# Patient Record
Sex: FEMALE | Race: BLACK OR AFRICAN AMERICAN | Employment: PART TIME | URBAN - METROPOLITAN AREA
[De-identification: names, ages, dates, MRNs, and addresses within clinical notes are randomized per-mention and may not be internally consistent; named-entity substitution may affect disease eponyms.]

---

## 2019-04-04 ENCOUNTER — HOSPITAL ENCOUNTER (OUTPATIENT)
Dept: NUCLEAR MEDICINE | Age: 39
Discharge: HOME | End: 2019-04-04
Payer: COMMERCIAL

## 2019-04-04 DIAGNOSIS — E05.90 THYROTOXICOSIS WITHOUT THYROID STORM, UNSPECIFIED THYROTOXICOSIS TYPE: ICD-10-CM

## 2019-04-04 PROCEDURE — 78014 THYROID IMAGING W/BLOOD FLOW: CPT

## 2019-04-04 PROCEDURE — A9509 IODINE I-123 SOD IODIDE MIL: HCPCS | Performed by: INTERNAL MEDICINE

## 2019-04-04 PROCEDURE — 3430000000 HC RX DIAGNOSTIC RADIOPHARMACEUTICAL: Performed by: INTERNAL MEDICINE

## 2019-04-04 RX ADMIN — Medication 0.23 MILLICURIE: at 09:30

## 2019-04-05 ENCOUNTER — HOSPITAL ENCOUNTER (OUTPATIENT)
Dept: NUCLEAR MEDICINE | Age: 39
Discharge: HOME | End: 2019-04-05
Payer: COMMERCIAL

## 2021-07-16 ENCOUNTER — HOSPITAL ENCOUNTER (OUTPATIENT)
Dept: GENERAL RADIOLOGY | Age: 41
Discharge: HOME | End: 2021-07-16
Payer: COMMERCIAL

## 2021-07-16 DIAGNOSIS — Z80.0: ICD-10-CM

## 2021-07-16 PROCEDURE — 74250 X-RAY XM SM INT 1CNTRST STD: CPT

## 2022-03-17 ENCOUNTER — HOSPITAL ENCOUNTER (OUTPATIENT)
Age: 42
Discharge: HOME | End: 2022-03-17

## 2022-03-17 ENCOUNTER — HOSPITAL ENCOUNTER (OUTPATIENT)
Dept: GENERAL RADIOLOGY | Age: 42
Discharge: HOME | End: 2022-03-17

## 2022-03-17 DIAGNOSIS — Z11.1 SCREENING-PULMONARY TB: ICD-10-CM

## 2022-03-17 PROCEDURE — 71046 X-RAY EXAM CHEST 2 VIEWS: CPT

## 2023-02-24 NOTE — PROGRESS NOTES
RHEUMATOLOGY NEW PATIENT VISIT    2023      Patient Name: Reyna Kim  : 1980  Medical Record: [id number]      CHIEF COMPLAINT    Polyarthralgia    Pertinent Problems  HTN  Lower quadrant abdominal pain  History of uterine fibroids    HISTORY OF PRESENT ILLNESS  Graves disease       Reyna Kim is a 38 y.o. female who was referred for above concerns. Symptom onset began about 6 months ago. Of note she has graves disease and and diffuse widespread pain since 3-4 years. Recently pain no involves her hip, hands,ankles and knees. She has used tylenol arthritis that worked art first bit no longer helping. She has never been seen by rheumatology. Follows with endocrinology and takes methimazole. Disease progression:   Today, patient describes joint knuckles, hip, ankle and knees. She notes swelling in her knuckles, legs and ankles   Joint stiffness in hips a.m and with prolonged sitting lasting 1-2 hours   Relieving factors: moving   Worsening factors: prolonged rest   Associated symptoms: wrist rash ++ denies fever, chest pain, rash, chronic cough, sob  Pain level today: 6-10/10   No recent hospitalizations or fever/infections   Functional status: she is an in patient nurse, works nights, sometimes stands for long hours. Other rheumatologic symptoms :  Dry eyes+ Denies chest pain, SOB, fever, rash, oral/nasal ulcers, change in mental status, dry mouth, Muscle pain, muscle weakness, raynaud's, puffy fingers or skin thickening. History of miscarriages, blood clots, dactylitis, uveitis, enthesitis, psoriasis, buttock pain, change in BM    Risk factors: Active smoking since 6 years, obesity+, occasional etoh, no recreational drug use, family hx of psoriasis, 1st cousin with RA and lupus     Current rheum meds: none     Past rheum meds: none     No flowsheet data found.         REVIEW OF SYSTEMS     Constitutional:  Denies fever or chills, decreased appetite, or weight loss   Eyes:  Dry eyes + HENT:  Denies dry mouth or oral ulcers  Respiratory:  Denies cough or shortness of breath   Cardiovascular:  Denies chest pain or edema   GI:  Denies abdominal pain, nausea, vomiting, bloody stools or diarrhea   :  Denies dysuria or hematuria  Musculoskeletal:  See HPI  Integument:  Rash+   Neurologic:  Denies headache, focal weakness or sensory changes   Endocrine:  Denies polyuria or polydipsia   Lymphatic:  Denies swollen glands   Psychiatric:  Denies depression or anxiety       PROBLEM LIST    Patient Active Problem List   Diagnosis    Benign essential hypertension, antepartum    Right lower quadrant abdominal pain    Uterine fibroids in pregnancy, postpartum condition       MEDICATIONS    Current Outpatient Medications   Medication Sig Dispense Refill    propranolol (INDERAL) 60 MG tablet Take 60 mg by mouth daily      Loratadine (CLARITIN PO) Take 1 tablet by mouth daily 10 mg      pantoprazole (PROTONIX) 40 MG tablet Take 40 mg by mouth daily      methIMAzole (TAPAZOLE) 5 MG tablet Take 5 mg by mouth daily      ibuprofen (ADVIL;MOTRIN) 800 MG tablet Take 1 tablet by mouth every 8 hours as needed for Pain (Patient not taking: Reported on 2/27/2023) 30 tablet 0    metroNIDAZOLE (FLAGYL) 500 MG tablet  (Patient not taking: Reported on 2/27/2023)      ciprofloxacin (CIPRO) 500 MG tablet  (Patient not taking: Reported on 2/27/2023)      Prenat w/o P-HY-Aqtfxvp-FA-DHA (PNV-DHA) 27-0.6-0.4-300 MG CAPS  (Patient not taking: Reported on 2/27/2023)      furosemide (LASIX) 20 MG tablet Take 1 tablet by mouth daily (Patient not taking: Reported on 2/27/2023) 60 tablet 3    carvedilol (COREG) 6.25 MG tablet Take 1 tablet by mouth 2 times daily (with meals) (Patient not taking: Reported on 2/27/2023) 60 tablet 3    potassium chloride SA (K-DUR;KLOR-CON M) 20 MEQ tablet Take 2 tablets by mouth daily (Patient not taking: Reported on 2/27/2023) 20 tablet 0    [id number] PO Take  by mouth.  (Patient not taking: Reported on 2/27/2023)       No current facility-administered medications for this visit. ALLERGIES    Allergies   Allergen Reactions    Aspirin Anaphylaxis    Pcn [Penicillins] Anaphylaxis       PAST MEDICAL HISTORY      No past medical history on file. SOCIAL HISTORY     Social History     Socioeconomic History    Marital status: Single   Tobacco Use    Smoking status: Never    Smokeless tobacco: Never   Substance and Sexual Activity    Alcohol use: No    Drug use: No    Sexual activity: Yes     Partners: Male         FAMILY HISTORY     No family history on file. PHYSICAL EXAM     Wt Readings from Last 3 Encounters:   02/27/23 209 lb (94.8 kg)   03/28/16 195 lb (88.5 kg)   09/30/15 211 lb 12.8 oz (96.1 kg)     Temp Readings from Last 3 Encounters:   03/28/16 98.3 °F (36.8 °C) (Oral)     BP Readings from Last 3 Encounters:   02/27/23 95/65   03/28/16 141/60   09/30/15 124/82     Pulse Readings from Last 3 Encounters:   02/27/23 80   03/28/16 97       General appearance:  Alert and oriented, NAD, well developed   HEENT: EOMI, no scleral injection, moist mucous membranes, no oral ulcers, normal hearing, no cartilage inflammation  Neck: Trachea midline, no masses  Lymph: no LAD  Lungs: CTAB, no rales  Heart: regular rate and rhythm, S1, S2 normal, no murmur, no lower extremity edema  Abdomen: Soft, ND, NT. + BS. Extremities: atraumatic, no cyanosis or edema. Neurologic: CN 2-12 grossly intact. Skin: No active rashes, warm and dry, no telangiectasias, no digital pitting, no sclerodactyly, no rheumatoid nodules, no livedo  MSK: normal ROM of the small joints of the hands, wrists, elbows, shoulders, hips, knees, ankles, or MTPs. 5/5 strength of the proximal and distal muscles of the upper and lower extremities.    HANDS: no synovitis, good ROM,   Elbow: No synovitis, good ROM,   Shoulder:good ROM,   Knee: no effusion, good ROM,   Ankle:good ROM,   FEET: neg forefoot squeeze test    Spine:  Normal range of motion; no tender points, no obvious deformities. Neuro:  Alert & oriented x 3, normal motor function, normal sensory function, no focal deficits noted . Muscle strength: 4/4 in bilateral upper and lower extremities. Psychiatric: Mood and affect are appropriate, recent and remote memory normal,          LABS AND IMAGING  Available labs were reviewed and discussed with patient   9/25/2015  WBC within normal limits  Hemoglobin 9.2, low  MCV within normal limits  Platelets 149, high  Lymphocytes within normal limits  Monocytes 10 high  Eosinophils 2.3 within normal limits    Assessment   Patient is a 79-year-old female with history of Graves' disease, dry eyes and periodic lower extremity swelling presenting with new onset pain in hands, hip, knees and ankle associated with swelling. She is currently taking methimazole for Graves' disease. Today examination findings did not show synovitis. But she reports stiffness in left hand. We will rule out inflammatory arthritis, use of methimazole increases risk for lupus-like arthralgia. We will investigate for that as well    1. Polyarthralgia  - Quantiferon, Incubated; Future  - Uric Acid; Future  - Rheumatoid Factor; Future  - XR HAND RIGHT (MIN 3 VIEWS); Future  - XR HAND LEFT (MIN 3 VIEWS); Future  - Hepatitis Panel, Acute; Future  - Cyclic Citrul Peptide Antibody, IgG; Future  - C-Reactive Protein; Future  - Sedimentation Rate; Future  - Renal Function Panel; Future  - CBC; Future  - Vitamin D 25 Hydroxy; Future  - Hepatic Function Panel; Future  - JUSTINO with HEp-2 IgG by IFA; Future  - HISTONE ANTIBODIES IGG; Future    2. Encounter for other specified special examinations  - Hepatitis Panel, Acute; Future    3. Obesity (BMI 30.0-34.9)  - Vitamin D 25 Hydroxy;  Future     Patient Instructions  Complete ordered labs and get imaging done  Okay to continue tylenol for now not exceeding 6 tabs per day   We will discuss results at next visit  RTC in 3 weeks -  The patient indicates understanding of these issues and agrees with the plan.     I spent 35  minutes on the date of service, preparing to see the patient (eg, review of tests), obtaining and/or reviewing separately obtained history, counseling and educating the family/caregiver, ordering medications, tests, or procedures, referring and communicating with other health care professionals, documenting clinical information in the electronic or other health record, care coordination (not separately reported)This note was dictated with voice recognition Haydee Aleman MD

## 2023-02-27 ENCOUNTER — HOSPITAL ENCOUNTER (OUTPATIENT)
Dept: GENERAL RADIOLOGY | Age: 43
Discharge: HOME | End: 2023-02-27
Payer: COMMERCIAL

## 2023-02-27 ENCOUNTER — HOSPITAL ENCOUNTER (OUTPATIENT)
Age: 43
Discharge: HOME | End: 2023-02-27
Payer: COMMERCIAL

## 2023-02-27 ENCOUNTER — OFFICE VISIT (OUTPATIENT)
Dept: RHEUMATOLOGY | Age: 43
End: 2023-02-27
Payer: COMMERCIAL

## 2023-02-27 VITALS
HEART RATE: 80 BPM | OXYGEN SATURATION: 99 % | DIASTOLIC BLOOD PRESSURE: 65 MMHG | BODY MASS INDEX: 35.87 KG/M2 | SYSTOLIC BLOOD PRESSURE: 95 MMHG | WEIGHT: 209 LBS

## 2023-02-27 DIAGNOSIS — M25.50 POLYARTHRALGIA: ICD-10-CM

## 2023-02-27 DIAGNOSIS — E66.9 OBESITY (BMI 30.0-34.9): ICD-10-CM

## 2023-02-27 DIAGNOSIS — M25.50 POLYARTHRALGIA: Primary | ICD-10-CM

## 2023-02-27 DIAGNOSIS — Z01.89 ENCOUNTER FOR OTHER SPECIFIED SPECIAL EXAMINATIONS: ICD-10-CM

## 2023-02-27 LAB
25(OH)D3 SERPL-MCNC: 11.56 NG/ML
ALBUMIN SERPL-MCNC: 4.4 GM/DL (ref 3.4–5)
ALBUMIN SERPL-MCNC: 4.4 GM/DL (ref 3.4–5)
ALP BLD-CCNC: 65 IU/L (ref 40–129)
ALT SERPL-CCNC: 10 U/L (ref 10–40)
ANION GAP SERPL CALCULATED.3IONS-SCNC: 8 MMOL/L (ref 4–16)
AST SERPL-CCNC: 16 IU/L (ref 15–37)
BILIRUB SERPL-MCNC: 0.2 MG/DL (ref 0–1)
BILIRUBIN DIRECT: 0.2 MG/DL (ref 0–0.3)
BILIRUBIN, INDIRECT: 0 MG/DL (ref 0–0.7)
BUN SERPL-MCNC: 13 MG/DL (ref 6–23)
CALCIUM SERPL-MCNC: 9.1 MG/DL (ref 8.3–10.6)
CHLORIDE BLD-SCNC: 102 MMOL/L (ref 99–110)
CO2: 25 MMOL/L (ref 21–32)
CREAT SERPL-MCNC: 0.7 MG/DL (ref 0.6–1.1)
ERYTHROCYTE SEDIMENTATION RATE: 16 MM/HR (ref 0–20)
GFR SERPL CREATININE-BSD FRML MDRD: >60 ML/MIN/1.73M2
GLUCOSE SERPL-MCNC: 77 MG/DL (ref 70–99)
HAV IGM SERPL QL IA: NON REACTIVE
HBV CORE IGM SERPL QL IA: NON REACTIVE
HBV SURFACE AG SERPL QL IA: NON REACTIVE
HCT VFR BLD CALC: 35.9 % (ref 37–47)
HCV AB SERPL QL IA: NON REACTIVE
HEMOGLOBIN: 11 GM/DL (ref 12.5–16)
HIGH SENSITIVE C-REACTIVE PROTEIN: 0.5 MG/L (ref 0–5)
MCH RBC QN AUTO: 26.3 PG (ref 27–31)
MCHC RBC AUTO-ENTMCNC: 30.6 % (ref 32–36)
MCV RBC AUTO: 85.9 FL (ref 78–100)
PDW BLD-RTO: 12.7 % (ref 11.7–14.9)
PHOSPHORUS: 2.9 MG/DL (ref 2.5–4.9)
PLATELET # BLD: 288 K/CU MM (ref 140–440)
PMV BLD AUTO: 10.6 FL (ref 7.5–11.1)
POTASSIUM SERPL-SCNC: 3.8 MMOL/L (ref 3.5–5.1)
RBC # BLD: 4.18 M/CU MM (ref 4.2–5.4)
SODIUM BLD-SCNC: 135 MMOL/L (ref 135–145)
TOTAL PROTEIN: 6.8 GM/DL (ref 6.4–8.2)
URATE SERPL-MCNC: 4.7 MG/DL (ref 2.6–6)
WBC # BLD: 6.7 K/CU MM (ref 4–10.5)

## 2023-02-27 PROCEDURE — 86141 C-REACTIVE PROTEIN HS: CPT

## 2023-02-27 PROCEDURE — 84550 ASSAY OF BLOOD/URIC ACID: CPT

## 2023-02-27 PROCEDURE — 82248 BILIRUBIN DIRECT: CPT

## 2023-02-27 PROCEDURE — 85652 RBC SED RATE AUTOMATED: CPT

## 2023-02-27 PROCEDURE — 85027 COMPLETE CBC AUTOMATED: CPT

## 2023-02-27 PROCEDURE — G8484 FLU IMMUNIZE NO ADMIN: HCPCS | Performed by: STUDENT IN AN ORGANIZED HEALTH CARE EDUCATION/TRAINING PROGRAM

## 2023-02-27 PROCEDURE — 36415 COLL VENOUS BLD VENIPUNCTURE: CPT

## 2023-02-27 PROCEDURE — G8427 DOCREV CUR MEDS BY ELIG CLIN: HCPCS | Performed by: STUDENT IN AN ORGANIZED HEALTH CARE EDUCATION/TRAINING PROGRAM

## 2023-02-27 PROCEDURE — 80053 COMPREHEN METABOLIC PANEL: CPT

## 2023-02-27 PROCEDURE — 99204 OFFICE O/P NEW MOD 45 MIN: CPT | Performed by: STUDENT IN AN ORGANIZED HEALTH CARE EDUCATION/TRAINING PROGRAM

## 2023-02-27 PROCEDURE — 86430 RHEUMATOID FACTOR TEST QUAL: CPT

## 2023-02-27 PROCEDURE — 83516 IMMUNOASSAY NONANTIBODY: CPT

## 2023-02-27 PROCEDURE — 82306 VITAMIN D 25 HYDROXY: CPT

## 2023-02-27 PROCEDURE — G8419 CALC BMI OUT NRM PARAM NOF/U: HCPCS | Performed by: STUDENT IN AN ORGANIZED HEALTH CARE EDUCATION/TRAINING PROGRAM

## 2023-02-27 PROCEDURE — 73130 X-RAY EXAM OF HAND: CPT

## 2023-02-27 PROCEDURE — 80074 ACUTE HEPATITIS PANEL: CPT

## 2023-02-27 PROCEDURE — 86200 CCP ANTIBODY: CPT

## 2023-02-27 PROCEDURE — 86038 ANTINUCLEAR ANTIBODIES: CPT

## 2023-02-27 PROCEDURE — 84100 ASSAY OF PHOSPHORUS: CPT

## 2023-02-27 PROCEDURE — 4004F PT TOBACCO SCREEN RCVD TLK: CPT | Performed by: STUDENT IN AN ORGANIZED HEALTH CARE EDUCATION/TRAINING PROGRAM

## 2023-02-27 PROCEDURE — 86480 TB TEST CELL IMMUN MEASURE: CPT

## 2023-02-27 RX ORDER — PANTOPRAZOLE SODIUM 40 MG/1
40 TABLET, DELAYED RELEASE ORAL DAILY
COMMUNITY

## 2023-02-27 RX ORDER — PROPRANOLOL HYDROCHLORIDE 60 MG/1
60 TABLET ORAL DAILY
COMMUNITY

## 2023-02-27 RX ORDER — METHIMAZOLE 5 MG/1
5 TABLET ORAL DAILY
COMMUNITY

## 2023-02-27 NOTE — PATIENT INSTRUCTIONS
Complete ordered labs and get imaging done  Okay to continue tylenol for now not exceeding 6 tabs per day   We will discuss results at next visit  RTC in 3 weeks

## 2023-02-28 LAB
CCP IGG SERPL-ACNC: 6 UNITS (ref 0–19)
RHEUMATOID FACTOR: <10 IU/ML (ref 0–14)

## 2023-03-01 LAB
NUCLEAR IGG SER QL IA: NORMAL
QUANTI TB1 MINUS NIL: 0 IU/ML (ref 0–0.34)
QUANTI TB2 MINUS NIL: 0 IU/ML (ref 0–0.34)
QUANTIFERON (R) TB GOLD (INCUBATED): NEGATIVE IU/ML
QUANTIFERON MITOGEN MINUS NIL: >10 IU/ML
QUANTIFERON NIL: 0.02 IU/ML

## 2023-03-02 LAB — HISTONE IGG SER IA-ACNC: 1.4 UNITS (ref 0–0.9)

## 2023-03-24 ENCOUNTER — OFFICE VISIT (OUTPATIENT)
Dept: RHEUMATOLOGY | Age: 43
End: 2023-03-24
Payer: COMMERCIAL

## 2023-03-24 VITALS
HEIGHT: 64 IN | WEIGHT: 204 LBS | RESPIRATION RATE: 14 BRPM | HEART RATE: 81 BPM | OXYGEN SATURATION: 99 % | SYSTOLIC BLOOD PRESSURE: 140 MMHG | DIASTOLIC BLOOD PRESSURE: 86 MMHG | BODY MASS INDEX: 34.83 KG/M2

## 2023-03-24 DIAGNOSIS — E55.9 VITAMIN D DEFICIENCY: ICD-10-CM

## 2023-03-24 DIAGNOSIS — M35.00 SICCA, UNSPECIFIED TYPE (HCC): ICD-10-CM

## 2023-03-24 DIAGNOSIS — M25.50 POLYARTHRALGIA: Primary | ICD-10-CM

## 2023-03-24 PROCEDURE — G8427 DOCREV CUR MEDS BY ELIG CLIN: HCPCS | Performed by: STUDENT IN AN ORGANIZED HEALTH CARE EDUCATION/TRAINING PROGRAM

## 2023-03-24 PROCEDURE — 1036F TOBACCO NON-USER: CPT | Performed by: STUDENT IN AN ORGANIZED HEALTH CARE EDUCATION/TRAINING PROGRAM

## 2023-03-24 PROCEDURE — G8417 CALC BMI ABV UP PARAM F/U: HCPCS | Performed by: STUDENT IN AN ORGANIZED HEALTH CARE EDUCATION/TRAINING PROGRAM

## 2023-03-24 PROCEDURE — 99214 OFFICE O/P EST MOD 30 MIN: CPT | Performed by: STUDENT IN AN ORGANIZED HEALTH CARE EDUCATION/TRAINING PROGRAM

## 2023-03-24 PROCEDURE — G8484 FLU IMMUNIZE NO ADMIN: HCPCS | Performed by: STUDENT IN AN ORGANIZED HEALTH CARE EDUCATION/TRAINING PROGRAM

## 2023-03-24 RX ORDER — MELATONIN
1000 DAILY
Qty: 90 TABLET | Refills: 1 | Status: SHIPPED | OUTPATIENT
Start: 2023-03-24

## 2023-03-24 NOTE — PROGRESS NOTES
ulcers  Respiratory:  Denies cough or shortness of breath   Cardiovascular:  Denies chest pain or edema   GI:  Denies abdominal pain, nausea, vomiting, bloody stools or diarrhea   :  Denies dysuria or hematuria  Musculoskeletal:  See HPI  Integument:  Rash+   Neurologic:  Denies headache, focal weakness or sensory changes   Endocrine:  Denies polyuria or polydipsia   Lymphatic:  Denies swollen glands   Psychiatric:  Denies depression or anxiety       PROBLEM LIST    Patient Active Problem List   Diagnosis    Benign essential hypertension, antepartum    Right lower quadrant abdominal pain    Uterine fibroids in pregnancy, postpartum condition       MEDICATIONS    Current Outpatient Medications   Medication Sig Dispense Refill    vitamin D3 (CHOLECALCIFEROL) 25 MCG (1000 UT) TABS tablet Take 1 tablet by mouth daily 90 tablet 1    propranolol (INDERAL) 60 MG tablet Take 60 mg by mouth daily      Loratadine (CLARITIN PO) Take 1 tablet by mouth daily 10 mg      pantoprazole (PROTONIX) 40 MG tablet Take 40 mg by mouth daily      methIMAzole (TAPAZOLE) 5 MG tablet Take 5 mg by mouth daily       No current facility-administered medications for this visit. ALLERGIES    Allergies   Allergen Reactions    Aspirin Anaphylaxis    Pcn [Penicillins] Anaphylaxis       PAST MEDICAL HISTORY      No past medical history on file. SOCIAL HISTORY     Social History     Socioeconomic History    Marital status: Single     Spouse name: None    Number of children: None    Years of education: None    Highest education level: None   Tobacco Use    Smoking status: Never    Smokeless tobacco: Never   Substance and Sexual Activity    Alcohol use: No    Drug use: No    Sexual activity: Yes     Partners: Male         FAMILY HISTORY     No family history on file.       PHYSICAL EXAM     Wt Readings from Last 3 Encounters:   03/24/23 204 lb (92.5 kg)   02/27/23 209 lb (94.8 kg)   03/28/16 195 lb (88.5 kg)     Temp Readings from Last 3

## 2023-03-24 NOTE — PATIENT INSTRUCTIONS
Complete Sjogren lab  Okay to continue tylenol for now not exceeding 6 tabs per day   RTC as needed unless sjogrens labs are abnormal

## 2023-03-27 ENCOUNTER — TELEPHONE (OUTPATIENT)
Dept: RHEUMATOLOGY | Age: 43
End: 2023-03-27

## 2023-03-27 NOTE — TELEPHONE ENCOUNTER
----- Message from Marylene Check, MD sent at 3/24/2023  1:35 PM EDT -----  Please notify patient that vit d is low and prescription was sent to pharmacy.

## 2025-06-10 ENCOUNTER — OFFICE VISIT (OUTPATIENT)
Dept: OBGYN | Age: 45
End: 2025-06-10
Payer: COMMERCIAL

## 2025-06-10 ENCOUNTER — HOSPITAL ENCOUNTER (OUTPATIENT)
Age: 45
Setting detail: SPECIMEN
Discharge: HOME | End: 2025-06-10
Payer: COMMERCIAL

## 2025-06-10 VITALS
HEIGHT: 64 IN | BODY MASS INDEX: 37.39 KG/M2 | HEART RATE: 106 BPM | DIASTOLIC BLOOD PRESSURE: 87 MMHG | SYSTOLIC BLOOD PRESSURE: 127 MMHG | WEIGHT: 219 LBS

## 2025-06-10 DIAGNOSIS — Z11.51 ENCOUNTER FOR SCREENING FOR HUMAN PAPILLOMAVIRUS (HPV): ICD-10-CM

## 2025-06-10 DIAGNOSIS — Z87.42 HISTORY OF ENDOMETRIOSIS: ICD-10-CM

## 2025-06-10 DIAGNOSIS — R10.32 LLQ PAIN: ICD-10-CM

## 2025-06-10 DIAGNOSIS — Z12.31 SCREENING MAMMOGRAM FOR BREAST CANCER: ICD-10-CM

## 2025-06-10 DIAGNOSIS — Z01.419 ENCOUNTER FOR ANNUAL ROUTINE GYNECOLOGICAL EXAMINATION: Primary | ICD-10-CM

## 2025-06-10 PROCEDURE — 99386 PREV VISIT NEW AGE 40-64: CPT | Performed by: OBSTETRICS & GYNECOLOGY

## 2025-06-10 PROCEDURE — 87624 HPV HI-RISK TYP POOLED RSLT: CPT

## 2025-06-10 PROCEDURE — G0123 SCREEN CERV/VAG THIN LAYER: HCPCS

## 2025-06-10 RX ORDER — ALPRAZOLAM 0.25 MG
TABLET ORAL
COMMUNITY
Start: 2022-05-09

## 2025-06-10 RX ORDER — BUPROPION HYDROCHLORIDE 300 MG/1
300 TABLET ORAL DAILY
COMMUNITY

## 2025-06-10 RX ORDER — ATOMOXETINE 40 MG/1
CAPSULE ORAL DAILY
COMMUNITY

## 2025-06-10 SDOH — ECONOMIC STABILITY: FOOD INSECURITY: WITHIN THE PAST 12 MONTHS, THE FOOD YOU BOUGHT JUST DIDN'T LAST AND YOU DIDN'T HAVE MONEY TO GET MORE.: NEVER TRUE

## 2025-06-10 SDOH — ECONOMIC STABILITY: FOOD INSECURITY: WITHIN THE PAST 12 MONTHS, YOU WORRIED THAT YOUR FOOD WOULD RUN OUT BEFORE YOU GOT MONEY TO BUY MORE.: NEVER TRUE

## 2025-06-10 ASSESSMENT — PATIENT HEALTH QUESTIONNAIRE - PHQ9
SUM OF ALL RESPONSES TO PHQ QUESTIONS 1-9: 0
1. LITTLE INTEREST OR PLEASURE IN DOING THINGS: NOT AT ALL
SUM OF ALL RESPONSES TO PHQ QUESTIONS 1-9: 0
2. FEELING DOWN, DEPRESSED OR HOPELESS: NOT AT ALL
SUM OF ALL RESPONSES TO PHQ QUESTIONS 1-9: 0
SUM OF ALL RESPONSES TO PHQ QUESTIONS 1-9: 0

## 2025-06-10 NOTE — PROGRESS NOTES
Skin:     General: Skin is warm.      Coloration: Skin is not ashen or cyanotic.      Findings: No abrasion, abscess or bruising. Rash is not crusting or macular.   Neurological:      Mental Status: She is alert and oriented to person, place, and time.         No results found for this visit on 06/10/25.    ASSESSMENT AND PLAN   Diagnosis Orders   1. Encounter for annual routine gynecological examination  PAP SMEAR      2. Encounter for screening for human papillomavirus (HPV)  PAP SMEAR      3. Screening mammogram for breast cancer  KAMRYN STEVE DIGITAL SCREEN BILATERAL PER PROTOCOL      4. LLQ pain        5. History of endometriosis          Also will follow up pcp    Return in about 1 year (around 6/10/2026).    Nick Martinez MD

## 2025-06-13 LAB
COMMENT: NORMAL
HPV OTHER HR TYPES: NOT DETECTED
HPV TYPE 16: NOT DETECTED
HPV TYPE 18: NOT DETECTED

## 2025-06-16 LAB — GYNECOLOGY CYTOLOGY REPORT: NORMAL

## 2025-07-02 ENCOUNTER — HOSPITAL ENCOUNTER (OUTPATIENT)
Dept: WOMENS IMAGING | Age: 45
Discharge: HOME | End: 2025-07-02
Attending: OBSTETRICS & GYNECOLOGY
Payer: COMMERCIAL

## 2025-07-02 VITALS — WEIGHT: 220 LBS | HEIGHT: 64 IN | BODY MASS INDEX: 37.56 KG/M2

## 2025-07-02 DIAGNOSIS — Z12.31 SCREENING MAMMOGRAM FOR BREAST CANCER: ICD-10-CM

## 2025-07-02 PROCEDURE — 77063 BREAST TOMOSYNTHESIS BI: CPT
